# Patient Record
Sex: FEMALE | Race: WHITE | Employment: FULL TIME | URBAN - METROPOLITAN AREA
[De-identification: names, ages, dates, MRNs, and addresses within clinical notes are randomized per-mention and may not be internally consistent; named-entity substitution may affect disease eponyms.]

---

## 2023-08-22 PROBLEM — I10 ESSENTIAL (PRIMARY) HYPERTENSION: Status: ACTIVE | Noted: 2023-08-22

## 2023-08-22 PROBLEM — E78.5 HYPERLIPIDEMIA: Status: ACTIVE | Noted: 2023-08-22

## 2023-08-22 RX ORDER — LOSARTAN POTASSIUM AND HYDROCHLOROTHIAZIDE 25; 100 MG/1; MG/1
1 TABLET ORAL DAILY
COMMUNITY
Start: 2023-04-06 | End: 2024-02-06 | Stop reason: SDUPTHER

## 2023-10-12 ENCOUNTER — OFFICE VISIT (OUTPATIENT)
Dept: PRIMARY CARE | Facility: CLINIC | Age: 58
End: 2023-10-12
Payer: COMMERCIAL

## 2023-10-12 VITALS
SYSTOLIC BLOOD PRESSURE: 158 MMHG | WEIGHT: 182.4 LBS | HEART RATE: 90 BPM | HEIGHT: 66 IN | OXYGEN SATURATION: 98 % | BODY MASS INDEX: 29.32 KG/M2 | DIASTOLIC BLOOD PRESSURE: 90 MMHG

## 2023-10-12 DIAGNOSIS — R73.01 IMPAIRED FASTING GLUCOSE: ICD-10-CM

## 2023-10-12 DIAGNOSIS — E78.2 MIXED HYPERLIPIDEMIA: ICD-10-CM

## 2023-10-12 DIAGNOSIS — I10 ESSENTIAL (PRIMARY) HYPERTENSION: Primary | ICD-10-CM

## 2023-10-12 DIAGNOSIS — I10 PRIMARY HYPERTENSION: ICD-10-CM

## 2023-10-12 DIAGNOSIS — E78.5 HYPERLIPIDEMIA, UNSPECIFIED HYPERLIPIDEMIA TYPE: ICD-10-CM

## 2023-10-12 LAB — POC HEMOGLOBIN A1C: 5.9 % (ref 4.2–6.5)

## 2023-10-12 PROCEDURE — 3080F DIAST BP >= 90 MM HG: CPT | Performed by: PHYSICIAN ASSISTANT

## 2023-10-12 PROCEDURE — 99213 OFFICE O/P EST LOW 20 MIN: CPT | Performed by: PHYSICIAN ASSISTANT

## 2023-10-12 PROCEDURE — 3077F SYST BP >= 140 MM HG: CPT | Performed by: PHYSICIAN ASSISTANT

## 2023-10-12 RX ORDER — AMLODIPINE BESYLATE 5 MG/1
5 TABLET ORAL DAILY
Qty: 90 TABLET | Refills: 1 | Status: SHIPPED | OUTPATIENT
Start: 2023-10-12 | End: 2024-02-06 | Stop reason: SDUPTHER

## 2023-10-12 ASSESSMENT — ENCOUNTER SYMPTOMS
DEPRESSION: 0
OCCASIONAL FEELINGS OF UNSTEADINESS: 0
PALPITATIONS: 0
LOSS OF SENSATION IN FEET: 0
SHORTNESS OF BREATH: 0

## 2023-10-12 ASSESSMENT — PAIN SCALES - GENERAL: PAINLEVEL: 0-NO PAIN

## 2023-10-12 ASSESSMENT — PATIENT HEALTH QUESTIONNAIRE - PHQ9
SUM OF ALL RESPONSES TO PHQ9 QUESTIONS 1 AND 2: 0
1. LITTLE INTEREST OR PLEASURE IN DOING THINGS: NOT AT ALL
2. FEELING DOWN, DEPRESSED OR HOPELESS: NOT AT ALL

## 2023-10-12 NOTE — PATIENT INSTRUCTIONS
Recond amlodipine 5mg daily for blood pressure to achieve goal.   Ordered CT calcium scoring study to assess cardiac risk further.   Continue low carb diet and exercise recommendation.

## 2023-10-12 NOTE — PROGRESS NOTES
"Subjective   Patient ID: Dora Danielle is a 58 y.o. female who presents for Follow-up.    Presents for f/up on HTN. Doing well without side effects on medication. Due for A1C - hx of IFG last A1C 6.3%. She is watching carbs. No other concerns.          Review of Systems   Respiratory:  Negative for shortness of breath.    Cardiovascular:  Negative for chest pain, palpitations and leg swelling.       Objective   /90   Pulse 90   Ht 1.676 m (5' 6\")   Wt 82.7 kg (182 lb 6.4 oz)   SpO2 98%   BMI 29.44 kg/m²     Physical Exam  HENT:      Mouth/Throat:      Pharynx: Oropharynx is clear.   Cardiovascular:      Rate and Rhythm: Normal rate and regular rhythm.   Pulmonary:      Breath sounds: Normal breath sounds.   Neurological:      Mental Status: She is alert.         Assessment/Plan   Diagnoses and all orders for this visit:  Essential (primary) hypertension  -     amLODIPine (Norvasc) 5 mg tablet; Take 1 tablet (5 mg) by mouth once daily.  Impaired fasting glucose  -     POCT glycosylated hemoglobin (Hb A1C) manually resulted  Hyperlipidemia, unspecified hyperlipidemia type  Mixed hyperlipidemia  -     CT cardiac scoring wo IV contrast; Future  Primary hypertension  -     CT cardiac scoring wo IV contrast; Future         "

## 2023-12-15 ENCOUNTER — TELEMEDICINE (OUTPATIENT)
Dept: PRIMARY CARE | Facility: CLINIC | Age: 58
End: 2023-12-15
Payer: COMMERCIAL

## 2023-12-15 VITALS — BODY MASS INDEX: 29.44 KG/M2 | HEIGHT: 66 IN

## 2023-12-15 DIAGNOSIS — J06.9 UPPER RESPIRATORY TRACT INFECTION, UNSPECIFIED TYPE: Primary | ICD-10-CM

## 2023-12-15 PROCEDURE — 99441 PR PHYS/QHP TELEPHONE EVALUATION 5-10 MIN: CPT | Performed by: PHYSICIAN ASSISTANT

## 2023-12-15 ASSESSMENT — PAIN SCALES - GENERAL: PAINLEVEL: 0-NO PAIN

## 2023-12-15 ASSESSMENT — PATIENT HEALTH QUESTIONNAIRE - PHQ9
1. LITTLE INTEREST OR PLEASURE IN DOING THINGS: NOT AT ALL
2. FEELING DOWN, DEPRESSED OR HOPELESS: NOT AT ALL
SUM OF ALL RESPONSES TO PHQ9 QUESTIONS 1 AND 2: 0

## 2023-12-15 NOTE — PROGRESS NOTES
"Subjective   Patient ID: Dora Danielle is a 58 y.o. female who presents for Nasal Congestion.    Presents for c/o congestion/URI. States she woke up this morning with sore throat. Has had nasal congestion and pressure, throat irritation. Denies fever or chills, headache, body aches. Has had poor sleep and many sick contacts.          Review of Systems   All other systems reviewed and are negative.      Objective   Ht 1.676 m (5' 6\")   BMI 29.44 kg/m²     Physical Exam    Assessment/Plan   Diagnoses and all orders for this visit:  Upper respiratory tract infection, unspecified type         "

## 2023-12-15 NOTE — PATIENT INSTRUCTIONS
Recommend OTC cold medication, mucinex as needed, saline nasal spray. May take 5-7 days to go away. Covid testing advised, call with positive test for antivirals - no contraindication or drug interactions for treatment.

## 2023-12-18 ENCOUNTER — OFFICE VISIT (OUTPATIENT)
Dept: PRIMARY CARE | Facility: CLINIC | Age: 58
End: 2023-12-18
Payer: COMMERCIAL

## 2023-12-18 VITALS
OXYGEN SATURATION: 98 % | HEART RATE: 76 BPM | DIASTOLIC BLOOD PRESSURE: 80 MMHG | WEIGHT: 183 LBS | BODY MASS INDEX: 29.41 KG/M2 | HEIGHT: 66 IN | SYSTOLIC BLOOD PRESSURE: 130 MMHG

## 2023-12-18 DIAGNOSIS — J02.9 SORE THROAT: ICD-10-CM

## 2023-12-18 DIAGNOSIS — J06.9 UPPER RESPIRATORY TRACT INFECTION, UNSPECIFIED TYPE: Primary | ICD-10-CM

## 2023-12-18 LAB — POC RAPID STREP: NEGATIVE

## 2023-12-18 PROCEDURE — 87081 CULTURE SCREEN ONLY: CPT | Performed by: PHYSICIAN ASSISTANT

## 2023-12-18 PROCEDURE — 3075F SYST BP GE 130 - 139MM HG: CPT | Performed by: PHYSICIAN ASSISTANT

## 2023-12-18 PROCEDURE — 99213 OFFICE O/P EST LOW 20 MIN: CPT | Performed by: PHYSICIAN ASSISTANT

## 2023-12-18 PROCEDURE — 87880 STREP A ASSAY W/OPTIC: CPT | Performed by: PHYSICIAN ASSISTANT

## 2023-12-18 PROCEDURE — 3079F DIAST BP 80-89 MM HG: CPT | Performed by: PHYSICIAN ASSISTANT

## 2023-12-18 ASSESSMENT — ENCOUNTER SYMPTOMS: SORE THROAT: 1

## 2023-12-18 ASSESSMENT — PAIN SCALES - GENERAL: PAINLEVEL: 0-NO PAIN

## 2023-12-18 NOTE — LETTER
December 18, 2023     Patient: Dora Danielle   YOB: 1965   Date of Visit: 12/18/2023       To Whom It May Concern:    Dora Danielle was seen in my clinic on 12/18/2023 at 2:45 pm. Please excuse Dora for her absence from work from 12/15/23-12/19/23. May return 12/20/23.    If you have any questions or concerns, please don't hesitate to call.         Sincerely,         Bennett Rooney PA-C        CC: No Recipients

## 2023-12-18 NOTE — PROGRESS NOTES
"Subjective   Patient ID: Dora Danielle is a 58 y.o. female who presents for Sore Throat (Patient states Sx started Friday. Patient states she has been congested. Patient states she has amoxicillin, taken three. COVID-neg. Patient states she was very cold but denies fever. ).    Presents for follow up URI - continues with sore throat, congestion since resolved with cold medication. Feels a bit better. Throat very sore and has had swollen glands. Covid testing negative yesterday.    Sore Throat          Review of Systems   HENT:  Positive for sore throat.    All other systems reviewed and are negative.      Objective   /80   Pulse 76   Ht 1.676 m (5' 6\")   Wt 83 kg (183 lb)   SpO2 98%   BMI 29.54 kg/m²     Physical Exam  HENT:      Head: Normocephalic and atraumatic.      Nose: Nose normal.      Mouth/Throat:      Pharynx: Posterior oropharyngeal erythema present.   Eyes:      Pupils: Pupils are equal, round, and reactive to light.   Pulmonary:      Breath sounds: Normal breath sounds.   Neurological:      Mental Status: She is alert.         Assessment/Plan   Diagnoses and all orders for this visit:  Upper respiratory tract infection, unspecified type  Sore throat  -     POCT Rapid Strep A manually resulted  -     Group A Streptococcus, Culture         "

## 2023-12-21 LAB — S PYO THROAT QL CULT: NORMAL

## 2024-02-06 DIAGNOSIS — I10 ESSENTIAL (PRIMARY) HYPERTENSION: ICD-10-CM

## 2024-02-06 RX ORDER — LOSARTAN POTASSIUM AND HYDROCHLOROTHIAZIDE 25; 100 MG/1; MG/1
1 TABLET ORAL DAILY
Qty: 90 TABLET | Refills: 1 | Status: SHIPPED | OUTPATIENT
Start: 2024-02-06

## 2024-02-06 RX ORDER — AMLODIPINE BESYLATE 5 MG/1
5 TABLET ORAL DAILY
Qty: 90 TABLET | Refills: 1 | Status: SHIPPED | OUTPATIENT
Start: 2024-02-06 | End: 2025-02-05

## 2024-02-06 NOTE — TELEPHONE ENCOUNTER
Patient called stating she missed placed both meds on Friday 2/2/204.  she was running low as well and would like for you to refill.

## 2024-02-13 ENCOUNTER — TELEPHONE (OUTPATIENT)
Dept: PRIMARY CARE | Facility: CLINIC | Age: 59
End: 2024-02-13
Payer: COMMERCIAL

## 2024-02-13 DIAGNOSIS — R73.01 IMPAIRED FASTING GLUCOSE: ICD-10-CM

## 2024-02-13 DIAGNOSIS — E78.5 HYPERLIPIDEMIA, UNSPECIFIED HYPERLIPIDEMIA TYPE: ICD-10-CM

## 2024-02-13 DIAGNOSIS — I10 ESSENTIAL (PRIMARY) HYPERTENSION: Primary | ICD-10-CM

## 2024-02-28 ENCOUNTER — LAB (OUTPATIENT)
Dept: LAB | Facility: LAB | Age: 59
End: 2024-02-28
Payer: COMMERCIAL

## 2024-02-28 DIAGNOSIS — E78.5 HYPERLIPIDEMIA, UNSPECIFIED HYPERLIPIDEMIA TYPE: ICD-10-CM

## 2024-02-28 DIAGNOSIS — R73.01 IMPAIRED FASTING GLUCOSE: ICD-10-CM

## 2024-02-28 LAB
ALBUMIN SERPL BCP-MCNC: 4.5 G/DL (ref 3.4–5)
ALP SERPL-CCNC: 67 U/L (ref 33–136)
ALT SERPL W P-5'-P-CCNC: 26 U/L (ref 7–45)
ANION GAP SERPL CALC-SCNC: 15 MMOL/L (ref 10–20)
AST SERPL W P-5'-P-CCNC: 27 U/L (ref 9–39)
BILIRUB SERPL-MCNC: 0.5 MG/DL (ref 0–1.2)
BUN SERPL-MCNC: 16 MG/DL (ref 6–23)
CALCIUM SERPL-MCNC: 10.9 MG/DL (ref 8.6–10.6)
CHLORIDE SERPL-SCNC: 103 MMOL/L (ref 98–107)
CHOLEST SERPL-MCNC: 248 MG/DL (ref 0–199)
CHOLESTEROL/HDL RATIO: 2.2
CO2 SERPL-SCNC: 27 MMOL/L (ref 21–32)
CREAT SERPL-MCNC: 0.8 MG/DL (ref 0.5–1.05)
EGFRCR SERPLBLD CKD-EPI 2021: 84 ML/MIN/1.73M*2
EST. AVERAGE GLUCOSE BLD GHB EST-MCNC: 120 MG/DL
GLUCOSE SERPL-MCNC: 111 MG/DL (ref 74–99)
HBA1C MFR BLD: 5.8 %
HDLC SERPL-MCNC: 112.4 MG/DL
LDLC SERPL CALC-MCNC: 114 MG/DL
NON HDL CHOLESTEROL: 136 MG/DL (ref 0–149)
POTASSIUM SERPL-SCNC: 3.8 MMOL/L (ref 3.5–5.3)
PROT SERPL-MCNC: 7.1 G/DL (ref 6.4–8.2)
SODIUM SERPL-SCNC: 141 MMOL/L (ref 136–145)
TRIGL SERPL-MCNC: 110 MG/DL (ref 0–149)
VLDL: 22 MG/DL (ref 0–40)

## 2024-02-28 PROCEDURE — 83036 HEMOGLOBIN GLYCOSYLATED A1C: CPT

## 2024-02-28 PROCEDURE — 80053 COMPREHEN METABOLIC PANEL: CPT

## 2024-02-28 PROCEDURE — 80061 LIPID PANEL: CPT

## 2024-02-28 PROCEDURE — 36415 COLL VENOUS BLD VENIPUNCTURE: CPT

## 2024-02-29 ENCOUNTER — APPOINTMENT (OUTPATIENT)
Dept: PRIMARY CARE | Facility: CLINIC | Age: 59
End: 2024-02-29
Payer: COMMERCIAL

## 2024-04-30 ENCOUNTER — OFFICE VISIT (OUTPATIENT)
Dept: PRIMARY CARE | Facility: CLINIC | Age: 59
End: 2024-04-30
Payer: COMMERCIAL

## 2024-04-30 VITALS
DIASTOLIC BLOOD PRESSURE: 80 MMHG | BODY MASS INDEX: 29.06 KG/M2 | SYSTOLIC BLOOD PRESSURE: 132 MMHG | HEART RATE: 84 BPM | WEIGHT: 180.8 LBS | HEIGHT: 66 IN | OXYGEN SATURATION: 96 %

## 2024-04-30 DIAGNOSIS — Z13.820 ENCOUNTER FOR SCREENING FOR OSTEOPOROSIS: ICD-10-CM

## 2024-04-30 DIAGNOSIS — E78.5 HYPERLIPIDEMIA, UNSPECIFIED HYPERLIPIDEMIA TYPE: ICD-10-CM

## 2024-04-30 DIAGNOSIS — R73.01 IMPAIRED FASTING GLUCOSE: ICD-10-CM

## 2024-04-30 DIAGNOSIS — Z87.891 HISTORY OF TOBACCO USE: ICD-10-CM

## 2024-04-30 DIAGNOSIS — N95.1 MENOPAUSAL STATE: ICD-10-CM

## 2024-04-30 DIAGNOSIS — Z12.31 BREAST CANCER SCREENING BY MAMMOGRAM: ICD-10-CM

## 2024-04-30 DIAGNOSIS — Z00.00 ROUTINE GENERAL MEDICAL EXAMINATION AT A HEALTH CARE FACILITY: Primary | ICD-10-CM

## 2024-04-30 DIAGNOSIS — I10 ESSENTIAL (PRIMARY) HYPERTENSION: ICD-10-CM

## 2024-04-30 DIAGNOSIS — E83.52 HYPERCALCEMIA: ICD-10-CM

## 2024-04-30 PROCEDURE — 99396 PREV VISIT EST AGE 40-64: CPT | Performed by: PHYSICIAN ASSISTANT

## 2024-04-30 PROCEDURE — 3075F SYST BP GE 130 - 139MM HG: CPT | Performed by: PHYSICIAN ASSISTANT

## 2024-04-30 PROCEDURE — 3079F DIAST BP 80-89 MM HG: CPT | Performed by: PHYSICIAN ASSISTANT

## 2024-04-30 ASSESSMENT — PATIENT HEALTH QUESTIONNAIRE - PHQ9
2. FEELING DOWN, DEPRESSED OR HOPELESS: NOT AT ALL
1. LITTLE INTEREST OR PLEASURE IN DOING THINGS: NOT AT ALL
SUM OF ALL RESPONSES TO PHQ9 QUESTIONS 1 AND 2: 0

## 2024-04-30 ASSESSMENT — PAIN SCALES - GENERAL: PAINLEVEL: 0-NO PAIN

## 2024-04-30 NOTE — PROGRESS NOTES
Subjective   Patient ID: Dora Danielle is a 59 y.o. female who presents for Annual Exam.    Presents for RPE.   Below is the patient's most recent value for Albumin, ALT, AST, BUN, Calcium, Chloride, Cholesterol, CO2, Creatinine, GFR, Glucose, HDL, Hematocrit, Hemoglobin, Hemoglobin A1C, LDL, Magnesium, Phosphorus, Platelets, Potassium, PSA, Sodium, Triglycerides, and WBC.   Lab Results       Component                Value               Date                       ALBUMIN                  4.5                 02/28/2024                 ALT                      26                  02/28/2024                 AST                      27                  02/28/2024                 BUN                      16                  02/28/2024                 CALCIUM                  10.9 (H)            02/28/2024                 CL                       103                 02/28/2024                 CHOL                     248 (H)             02/28/2024                 CO2                      27                  02/28/2024                 CREATININE               0.80                02/28/2024                 HDL                      112.4               02/28/2024                 HCT                      46.5 (H)            12/21/2022                 HGB                      16.2 (H)            12/21/2022                 HGBA1C                   5.8 (H)             02/28/2024                 PLT                      206                 12/21/2022                 K                        3.8                 02/28/2024                 NA                       141                 02/28/2024                 TRIG                     110                 02/28/2024                 WBC                      5.0                 12/21/2022            Note: for a comprehensive list of the patient's lab results, access the Results Review activity.           Review of Systems   All other systems reviewed and are negative.      Objective  "  /80   Pulse 84   Ht 1.676 m (5' 6\")   Wt 82 kg (180 lb 12.8 oz)   SpO2 96%   BMI 29.18 kg/m²     Physical Exam  Constitutional:       General: She is not in acute distress.     Appearance: Normal appearance.   HENT:      Right Ear: Tympanic membrane and ear canal normal.      Left Ear: Tympanic membrane and ear canal normal.      Nose: No congestion.      Mouth/Throat:      Pharynx: Oropharynx is clear.   Eyes:      Conjunctiva/sclera: Conjunctivae normal.      Pupils: Pupils are equal, round, and reactive to light.   Cardiovascular:      Rate and Rhythm: Normal rate and regular rhythm.      Heart sounds: No murmur heard.  Pulmonary:      Breath sounds: Normal breath sounds.   Abdominal:      General: Bowel sounds are normal.      Palpations: Abdomen is soft.   Musculoskeletal:         General: Normal range of motion.   Lymphadenopathy:      Cervical: No cervical adenopathy.   Skin:     Findings: No rash.   Neurological:      General: No focal deficit present.      Mental Status: She is alert.         Assessment/Plan   Diagnoses and all orders for this visit:  Routine general medical examination at a health care facility  Essential (primary) hypertension  Hyperlipidemia, unspecified hyperlipidemia type  Impaired fasting glucose  Menopausal state  -     XR DEXA bone density axial skeleton w VFA; Future  Encounter for screening for osteoporosis  -     XR DEXA bone density axial skeleton w VFA; Future         "

## 2024-05-22 ENCOUNTER — HOSPITAL ENCOUNTER (OUTPATIENT)
Dept: RADIOLOGY | Facility: HOSPITAL | Age: 59
Discharge: HOME | End: 2024-05-22
Payer: COMMERCIAL

## 2024-05-22 DIAGNOSIS — Z87.891 HISTORY OF TOBACCO USE: ICD-10-CM

## 2024-05-22 DIAGNOSIS — Z13.820 ENCOUNTER FOR SCREENING FOR OSTEOPOROSIS: ICD-10-CM

## 2024-05-22 DIAGNOSIS — N95.1 MENOPAUSAL STATE: ICD-10-CM

## 2024-05-22 PROCEDURE — 71271 CT THORAX LUNG CANCER SCR C-: CPT

## 2024-05-22 PROCEDURE — 77085 DXA BONE DENSITY AXL VRT FX: CPT | Performed by: RADIOLOGY

## 2024-05-22 PROCEDURE — 77085 DXA BONE DENSITY AXL VRT FX: CPT

## 2024-05-23 DIAGNOSIS — R91.1 LUNG NODULE: Primary | ICD-10-CM

## 2024-06-17 DIAGNOSIS — I10 ESSENTIAL (PRIMARY) HYPERTENSION: ICD-10-CM

## 2024-06-17 RX ORDER — LOSARTAN POTASSIUM AND HYDROCHLOROTHIAZIDE 25; 100 MG/1; MG/1
1 TABLET ORAL DAILY
Qty: 90 TABLET | Refills: 1 | Status: SHIPPED | OUTPATIENT
Start: 2024-06-17

## 2024-06-17 RX ORDER — AMLODIPINE BESYLATE 5 MG/1
5 TABLET ORAL DAILY
Qty: 90 TABLET | Refills: 1 | Status: SHIPPED | OUTPATIENT
Start: 2024-06-17 | End: 2025-06-17

## 2024-06-21 ENCOUNTER — TELEPHONE (OUTPATIENT)
Dept: PRIMARY CARE | Facility: CLINIC | Age: 59
End: 2024-06-21
Payer: COMMERCIAL

## 2024-06-21 NOTE — TELEPHONE ENCOUNTER
Patient called requesting an order/letter stating she needs to be put to sleep in order to get her chest CT done due to her being claustrophobic. Patient said the one she had done last month was miserable, it was extremely hard for her to remain calm and still.

## 2024-07-11 NOTE — TELEPHONE ENCOUNTER
Discussed options with patient - has taken diazepam in the past which helped. Will prescribe this for prior to imaging. She is aware she will not be able to drive while taking this. Can call scheduling - CT to be done 11/22 or later.

## 2024-07-11 NOTE — TELEPHONE ENCOUNTER
Patient called again requesting an order to be put to sleep or sedated for her follow up CT in 6 months due to her claustrophobia.

## 2024-09-10 ENCOUNTER — TELEPHONE (OUTPATIENT)
Dept: PRIMARY CARE | Facility: CLINIC | Age: 59
End: 2024-09-10
Payer: COMMERCIAL

## 2024-09-10 NOTE — TELEPHONE ENCOUNTER
Pt called scheduling an appt for Sept. 24th with Bennett to discuss left arm pain. Pt states that she has been having left arm pain and numbness in the fingers in that hand as well. Pt wanted to make an appt later due to pt being out of town. Pt would like to know what she should do in the mean time. Pt states that she believes that her symptoms are due to having the covid shot in that arm about three years ago. Pt can be reached at [phone].

## 2024-09-24 ENCOUNTER — APPOINTMENT (OUTPATIENT)
Dept: PRIMARY CARE | Facility: CLINIC | Age: 59
End: 2024-09-24
Payer: COMMERCIAL

## 2024-10-22 DIAGNOSIS — I10 ESSENTIAL (PRIMARY) HYPERTENSION: ICD-10-CM

## 2024-10-22 RX ORDER — AMLODIPINE BESYLATE 5 MG/1
5 TABLET ORAL DAILY
Qty: 90 TABLET | Refills: 1 | Status: SHIPPED | OUTPATIENT
Start: 2024-10-22 | End: 2025-10-22

## 2024-10-22 RX ORDER — LOSARTAN POTASSIUM AND HYDROCHLOROTHIAZIDE 25; 100 MG/1; MG/1
1 TABLET ORAL DAILY
Qty: 90 TABLET | Refills: 1 | Status: SHIPPED | OUTPATIENT
Start: 2024-10-22

## 2024-11-27 ENCOUNTER — LAB (OUTPATIENT)
Dept: LAB | Facility: LAB | Age: 59
End: 2024-11-27
Payer: COMMERCIAL

## 2024-11-27 DIAGNOSIS — E78.5 HYPERLIPIDEMIA, UNSPECIFIED HYPERLIPIDEMIA TYPE: ICD-10-CM

## 2024-11-27 DIAGNOSIS — E83.52 HYPERCALCEMIA: ICD-10-CM

## 2024-11-27 LAB
25(OH)D3 SERPL-MCNC: 10 NG/ML (ref 30–100)
ALBUMIN SERPL BCP-MCNC: 4.3 G/DL (ref 3.4–5)
ALP SERPL-CCNC: 83 U/L (ref 33–136)
ALT SERPL W P-5'-P-CCNC: 21 U/L (ref 7–45)
ANION GAP SERPL CALC-SCNC: 15 MMOL/L (ref 10–20)
AST SERPL W P-5'-P-CCNC: 22 U/L (ref 9–39)
BILIRUB SERPL-MCNC: 0.4 MG/DL (ref 0–1.2)
BUN SERPL-MCNC: 17 MG/DL (ref 6–23)
CA-I BLD-SCNC: 1.38 MMOL/L (ref 1.1–1.33)
CALCIUM SERPL-MCNC: 10.6 MG/DL (ref 8.6–10.6)
CHLORIDE SERPL-SCNC: 103 MMOL/L (ref 98–107)
CHOLEST SERPL-MCNC: 246 MG/DL (ref 0–199)
CHOLESTEROL/HDL RATIO: 2.5
CO2 SERPL-SCNC: 28 MMOL/L (ref 21–32)
CREAT SERPL-MCNC: 0.82 MG/DL (ref 0.5–1.05)
EGFRCR SERPLBLD CKD-EPI 2021: 81 ML/MIN/1.73M*2
GLUCOSE SERPL-MCNC: 114 MG/DL (ref 74–99)
HDLC SERPL-MCNC: 98.3 MG/DL
LDLC SERPL CALC-MCNC: 106 MG/DL
NON HDL CHOLESTEROL: 148 MG/DL (ref 0–149)
POTASSIUM SERPL-SCNC: 3.9 MMOL/L (ref 3.5–5.3)
PROT SERPL-MCNC: 6.8 G/DL (ref 6.4–8.2)
PTH-INTACT SERPL-MCNC: 22 PG/ML (ref 18.5–88)
SODIUM SERPL-SCNC: 142 MMOL/L (ref 136–145)
TRIGL SERPL-MCNC: 211 MG/DL (ref 0–149)
VLDL: 42 MG/DL (ref 0–40)

## 2024-11-27 PROCEDURE — 80061 LIPID PANEL: CPT

## 2024-11-27 PROCEDURE — 82306 VITAMIN D 25 HYDROXY: CPT

## 2024-11-27 PROCEDURE — 80053 COMPREHEN METABOLIC PANEL: CPT

## 2024-11-27 PROCEDURE — 83970 ASSAY OF PARATHORMONE: CPT

## 2024-11-27 PROCEDURE — 36415 COLL VENOUS BLD VENIPUNCTURE: CPT

## 2024-11-27 PROCEDURE — 82330 ASSAY OF CALCIUM: CPT

## 2024-12-04 ENCOUNTER — APPOINTMENT (OUTPATIENT)
Dept: PRIMARY CARE | Facility: CLINIC | Age: 59
End: 2024-12-04
Payer: COMMERCIAL

## 2024-12-04 ENCOUNTER — HOSPITAL ENCOUNTER (OUTPATIENT)
Dept: RADIOLOGY | Facility: HOSPITAL | Age: 59
Discharge: HOME | End: 2024-12-04
Payer: COMMERCIAL

## 2024-12-04 DIAGNOSIS — R91.1 LUNG NODULE: ICD-10-CM

## 2024-12-04 PROCEDURE — 71250 CT THORAX DX C-: CPT

## 2024-12-04 PROCEDURE — 71250 CT THORAX DX C-: CPT | Performed by: RADIOLOGY

## 2025-01-22 ENCOUNTER — OFFICE VISIT (OUTPATIENT)
Dept: PRIMARY CARE | Facility: CLINIC | Age: 60
End: 2025-01-22
Payer: COMMERCIAL

## 2025-01-22 VITALS
DIASTOLIC BLOOD PRESSURE: 78 MMHG | OXYGEN SATURATION: 96 % | SYSTOLIC BLOOD PRESSURE: 138 MMHG | WEIGHT: 179 LBS | BODY MASS INDEX: 28.77 KG/M2 | HEIGHT: 66 IN | HEART RATE: 90 BPM

## 2025-01-22 DIAGNOSIS — I10 ESSENTIAL (PRIMARY) HYPERTENSION: ICD-10-CM

## 2025-01-22 DIAGNOSIS — M54.12 CERVICAL RADICULOPATHY: ICD-10-CM

## 2025-01-22 DIAGNOSIS — E78.5 HYPERLIPIDEMIA, UNSPECIFIED HYPERLIPIDEMIA TYPE: ICD-10-CM

## 2025-01-22 DIAGNOSIS — R73.01 IMPAIRED FASTING GLUCOSE: ICD-10-CM

## 2025-01-22 DIAGNOSIS — E55.9 VITAMIN D DEFICIENCY: Primary | ICD-10-CM

## 2025-01-22 PROCEDURE — 3075F SYST BP GE 130 - 139MM HG: CPT | Performed by: PHYSICIAN ASSISTANT

## 2025-01-22 PROCEDURE — 3008F BODY MASS INDEX DOCD: CPT | Performed by: PHYSICIAN ASSISTANT

## 2025-01-22 PROCEDURE — 99214 OFFICE O/P EST MOD 30 MIN: CPT | Performed by: PHYSICIAN ASSISTANT

## 2025-01-22 PROCEDURE — 3078F DIAST BP <80 MM HG: CPT | Performed by: PHYSICIAN ASSISTANT

## 2025-01-22 RX ORDER — GABAPENTIN 300 MG/1
300 CAPSULE ORAL NIGHTLY
Qty: 30 CAPSULE | Refills: 2 | Status: SHIPPED | OUTPATIENT
Start: 2025-01-22 | End: 2025-07-21

## 2025-01-22 RX ORDER — ERGOCALCIFEROL 1.25 MG/1
50000 CAPSULE ORAL WEEKLY
Qty: 12 CAPSULE | Refills: 1 | Status: SHIPPED | OUTPATIENT
Start: 2025-01-22 | End: 2025-07-09

## 2025-01-22 ASSESSMENT — PATIENT HEALTH QUESTIONNAIRE - PHQ9
SUM OF ALL RESPONSES TO PHQ9 QUESTIONS 1 AND 2: 0
2. FEELING DOWN, DEPRESSED OR HOPELESS: NOT AT ALL
1. LITTLE INTEREST OR PLEASURE IN DOING THINGS: NOT AT ALL

## 2025-01-22 ASSESSMENT — PAIN SCALES - GENERAL: PAINLEVEL_OUTOF10: 0-NO PAIN

## 2025-01-22 ASSESSMENT — ENCOUNTER SYMPTOMS
NUMBNESS: 1
NECK PAIN: 0

## 2025-01-22 NOTE — PROGRESS NOTES
"Subjective   Patient ID: Dora Danielle is a 59 y.o. female who presents for Follow-up.    Patient is here for a regular check-up. She states she was supposed to be here in November but was busy.    Describes left arm numbness ever since the first Covid vaccine. States it is sometimes hard to lay on and believes it's getting worse. She states the tingling is in her fingers along with decreased sensation. States it is starting to become painful later in the day for the past 6 months. Denies pain with movement, shoulder pain, or any prior injury.         Review of Systems   Musculoskeletal:  Negative for neck pain.   Neurological:  Positive for numbness (Entirety of left arm with tingling in the fingers).   All other systems reviewed and are negative.      Objective   /78   Pulse 90   Ht 1.676 m (5' 6\")   Wt 81.2 kg (179 lb)   SpO2 96%   BMI 28.89 kg/m²     Physical Exam  Constitutional:       Appearance: Normal appearance.   HENT:      Right Ear: Tympanic membrane normal.      Left Ear: Tympanic membrane normal.      Mouth/Throat:      Mouth: Mucous membranes are moist.   Eyes:      Extraocular Movements: Extraocular movements intact.      Pupils: Pupils are equal, round, and reactive to light.   Cardiovascular:      Rate and Rhythm: Normal rate and regular rhythm.   Pulmonary:      Effort: Pulmonary effort is normal.      Breath sounds: Normal breath sounds.   Abdominal:      General: Bowel sounds are normal.      Palpations: Abdomen is soft.   Musculoskeletal:      Left shoulder: Decreased range of motion (Pain in left shoulder with shoulder flexion). Decreased strength (Deficit with empty can test).      Cervical back: Tenderness (Tenderness to palpation of the left rhomboid muscles) present. Decreased range of motion (Deficit with right lateral flexion).      Comments: Negative Phalen's sign   Neurological:      Mental Status: She is alert and oriented to person, place, and time. "         Assessment/Plan   Diagnoses and all orders for this visit:  Vitamin D deficiency  -     ergocalciferol (Vitamin D-2) 1.25 MG (42167 UT) capsule; Take 1 capsule (50,000 Units) by mouth 1 (one) time per week.  -     Vitamin D 25-Hydroxy,Total (for eval of Vitamin D levels); Future  Cervical radiculopathy  -     XR cervical spine complete 4-5 views; Future  -     Referral to Physical Therapy; Future  -     gabapentin (Neurontin) 300 mg capsule; Take 1 capsule (300 mg) by mouth once daily at bedtime.  Essential (primary) hypertension  Impaired fasting glucose  -     Hemoglobin A1C; Future  -     Lipid Panel; Future  -     Comprehensive Metabolic Panel; Future  Hyperlipidemia, unspecified hyperlipidemia type  -     Hemoglobin A1C; Future  -     Lipid Panel; Future  -     Comprehensive Metabolic Panel; Future

## 2025-01-28 ENCOUNTER — HOSPITAL ENCOUNTER (OUTPATIENT)
Dept: RADIOLOGY | Facility: CLINIC | Age: 60
Discharge: HOME | End: 2025-01-28
Payer: COMMERCIAL

## 2025-01-28 DIAGNOSIS — M54.12 CERVICAL RADICULOPATHY: ICD-10-CM

## 2025-01-28 PROCEDURE — 72050 X-RAY EXAM NECK SPINE 4/5VWS: CPT

## 2025-01-31 DIAGNOSIS — I10 ESSENTIAL (PRIMARY) HYPERTENSION: ICD-10-CM

## 2025-01-31 DIAGNOSIS — F40.240 CLAUSTROPHOBIA: ICD-10-CM

## 2025-01-31 DIAGNOSIS — M54.12 CERVICAL RADICULOPATHY: Primary | ICD-10-CM

## 2025-01-31 RX ORDER — AMLODIPINE BESYLATE 5 MG/1
5 TABLET ORAL DAILY
Qty: 90 TABLET | Refills: 1 | Status: SHIPPED | OUTPATIENT
Start: 2025-01-31 | End: 2026-01-31

## 2025-01-31 RX ORDER — LOSARTAN POTASSIUM AND HYDROCHLOROTHIAZIDE 25; 100 MG/1; MG/1
1 TABLET ORAL DAILY
Qty: 90 TABLET | Refills: 1 | Status: SHIPPED | OUTPATIENT
Start: 2025-01-31

## 2025-01-31 NOTE — TELEPHONE ENCOUNTER
Patient verbally understands. Requesting some Valium for the MRI. Pharmacy is [facility] [place].

## 2025-01-31 NOTE — TELEPHONE ENCOUNTER
Recommend MRI of her neck. She has several areas of disc space narrowing. She can start PT in the meantime.

## 2025-02-05 RX ORDER — DIAZEPAM 5 MG/1
5-10 TABLET ORAL DAILY PRN
Qty: 2 TABLET | Refills: 0 | Status: SHIPPED | OUTPATIENT
Start: 2025-02-05 | End: 2025-02-06

## 2025-02-17 ENCOUNTER — EVALUATION (OUTPATIENT)
Dept: PHYSICAL THERAPY | Facility: CLINIC | Age: 60
End: 2025-02-17
Payer: COMMERCIAL

## 2025-02-17 DIAGNOSIS — M54.12 CERVICAL RADICULOPATHY: ICD-10-CM

## 2025-02-17 PROCEDURE — 97161 PT EVAL LOW COMPLEX 20 MIN: CPT | Mod: GP

## 2025-02-17 ASSESSMENT — PAIN SCALES - GENERAL: PAINLEVEL_OUTOF10: 8

## 2025-02-17 ASSESSMENT — PAIN - FUNCTIONAL ASSESSMENT: PAIN_FUNCTIONAL_ASSESSMENT: 0-10

## 2025-02-17 ASSESSMENT — PAIN DESCRIPTION - DESCRIPTORS: DESCRIPTORS: ACHING

## 2025-02-17 NOTE — PROGRESS NOTES
Physical Therapy Evaluation    Patient Name: Dora Danielle  MRN: [id number]  Evaluation Date: 2/17/2025  Time Calculation  Start Time: 0830  Stop Time: 0900  Time Calculation (min): 30 min  PT Evaluation Time Entry  PT Evaluation (Low) Time Entry: 30             Problem List Items Addressed This Visit             ICD-10-CM    Cervical radiculopathy M54.12    Relevant Orders    Follow Up In Physical Therapy         Subjective   General:  General  Reason for Referral: Cervical Radiculopathy  Referred By: Bennett Rooney PA-C  Past Medical History Relevant to Rehab: 60 y/o F who presents for PT evaluation with cc of two year hx of B/L neck/shoulder pain L > R with h/o L hand paraesthesia. MRI pending. XR revealing arthritis.  Pain worse at night. Not bad during day.  Denies falls, trauma, or h/o neck injury.    Patient reported hx of condition: H/O radicular neck/shoulder pain x 2 years, non-specific onset    Surgery:   No    Precautions:   Neck pain considerations    Relevant PMH:  Smoker, HTN, R wrist FX    Red flags: No    Pain:  Pain Assessment: 0-10  0-10 (Numeric) Pain Score: 8  Pain Type: Acute pain  Pain Location: Neck  Pain Orientation: Other (Comment) (B/L upper trap and shoulders L > R)  Pain Radiating Towards: L hand  Pain Descriptors: Aching  Home Living:  Home Living Comment: Independent    Prior Function Per Pt/Caregiver Report:  Ambulatory Assistance: Independent    Imaging:  XR C SPINE;  AP, lateral and oblique views were obtained. Vertebral body heights  are maintained. Endplate hypertrophy is noted C4 through C7. The C4-5  and C5-6 discs are moderately narrowed. Diffuse facet arthropathy is  present. Uncovertebral hypertrophy contributes to foraminal narrowing  at C4-5 and C5-6 levels. There is no spondylolisthesis.      OBJECTIVE:  Objective   Posture:  Posture Comment: Forward head posturing, B/L scapular winging with poor ADDuction and depression  Range of Motion:  Range of Motion  Comments: R cervical rotation 52, L 60 degrees.  Flexion/extension WNL, non painful. Pain only with R rotation  Strength:  Strength Comments: 3+/5 B/L UE's  Flexibility:  Flexibility Comment: + Trigger points L Capitis, rhomboids, L posterior delt  Palpation:  Palpation Comment: Tender L neck/shoulder with palpation  Special Tests:  Special Tests Comment: Negative alar ligament. Negative shoulder articular exam B/L  Gait:  Gait Comment: WNl  Balance:  Balance Comment: WNL  Stairs:  Stairs Comment: N/A  Bed Mobility:  Bed Mobility Comment: N/A  Transfers:  Transfers Comment: IND    Outcome Measures:  NDI: 10% impairment    Assessment  PT Assessment Results: Decreased strength, Decreased range of motion, Decreased endurance, Pain, Impaired sensation, Decreased mobility  Rehab Prognosis: Good  Evaluation/Treatment Tolerance: Patient limited by pain    Pt is a 59 y.o. female who presents with impairments of neck/shoulder pain, impaired neck AROM, impaired UE strength. These impairments have led to functional limitations including impaired sleep quality and activity tolerance. Pt would benefit from skilled physical therapy intervention to improve above impairments and facilitate return to function.    Complexity of Evaluation: Low    Based on the history including personal factors and/or comorbidities, examination of body systems including body structures and function, activity limitations, and/or participation restrictions, as well as clinical presentation, patient meets criteria for above complexity evaluation.    Plan  Treatment/Interventions: Biofeedback, Aquatic therapy, Cryotherapy, Dry needling, Education/ Instruction, Electrical stimulation, Manual therapy, Mechanical traction, Neuromuscular re-education, Self care/ home management, Taping techniques, Therapeutic activities, Therapeutic exercises, Ultrasound  PT Plan: Skilled PT  PT Frequency: 2 times per week  Certification Period Start Date: 02/17/25  Number of  Treatments Authorized: MN, re-evaluate visit 10  Rehab Potential: Good  Plan of Care Agreement: Patient    Insurance Plan: Payor: JOHNNY / Plan: [employer] HEALTH PLAN / Product Type: *No Product type* /     Plan for next visit: Manual STM and cervical manual traction, neck/posterior chain strengthening.     OP EDUCATION:  Outpatient Education  Individual(s) Educated: Patient  Education Provided: POC  Risk and Benefits Discussed with Patient/Caregiver/Other: yes  Patient/Caregiver Demonstrated Understanding: yes  Plan of Care Discussed and Agreed Upon: yes  Patient Response to Education: Patient/Caregiver Verbalized Understanding of Information    Today's Treatment:  Evaluation and discussion only  HEP to be completed daily, exercises include:  To be developed    Goals:  STG 1: Patient will be IND with postural correction/scapular strengthening x 5 visits  LTG 1: Patient will report 0% score NDI  LTG 2: Patient will demonstrate 4/5 B/L UE strength  LTG 3: Patient will demonstrate 60 deg B/L cervical rotation  LTG 4: Patient will report > 50% improvement in sleep quality with regards to cervical radiculopathy symptoms.

## 2025-02-27 ENCOUNTER — HOSPITAL ENCOUNTER (OUTPATIENT)
Dept: RADIOLOGY | Facility: CLINIC | Age: 60
Discharge: HOME | End: 2025-02-27
Payer: COMMERCIAL

## 2025-02-27 DIAGNOSIS — M54.12 CERVICAL RADICULOPATHY: ICD-10-CM

## 2025-02-27 PROCEDURE — 72141 MRI NECK SPINE W/O DYE: CPT

## 2025-02-28 ENCOUNTER — TELEPHONE (OUTPATIENT)
Dept: PRIMARY CARE | Facility: CLINIC | Age: 60
End: 2025-02-28
Payer: COMMERCIAL

## 2025-02-28 DIAGNOSIS — M54.12 CERVICAL RADICULOPATHY: Primary | ICD-10-CM

## 2025-02-28 NOTE — TELEPHONE ENCOUNTER
MRI shows several regions of disc herniations and nerve entrapment. Recommend continued PT and follow up with pain medicine, will place referral with Dr. Webster

## 2025-03-05 ENCOUNTER — TREATMENT (OUTPATIENT)
Dept: PHYSICAL THERAPY | Facility: CLINIC | Age: 60
End: 2025-03-05
Payer: COMMERCIAL

## 2025-03-05 DIAGNOSIS — M54.12 CERVICAL RADICULOPATHY: ICD-10-CM

## 2025-03-05 PROCEDURE — 97012 MECHANICAL TRACTION THERAPY: CPT | Mod: GP

## 2025-03-05 NOTE — PROGRESS NOTES
"    Physical Therapy Treatment    Patient Name: Dora Danielle  MRN: [id number]  Encounter date:  3/5/2025  Time Calculation  Start Time: 0745  Stop Time: 0815  Time Calculation (min): 30 min  PT Modalities Time Entry  Mechanical Traction Time Entry: 15          Visit Number:  2 (including evaluation)  Planned total visits: 10-20  Visits Authorized/Insurance Coverage:  [phone]    Current Problem  Problem List Items Addressed This Visit             ICD-10-CM    Cervical radiculopathy M54.12       Precautions   Neck/UE pain considerations    Pain  6-7    Subjective  No changes since evaluation.  Had MRI.  Shows multi-level degen and disc bulge on spinal cord C4-C5    Objective  Limited neck AROM    Treatment:  Mechanical traction x 15 minutes for cervical spine vertebral and cord decompression  60/20 on/off x 10/5 lb pulls  Wedge under LE's  No CI identified.  Gives consent.     Current HEP:  Chin tuck issued today, 3 x 10 reps BID    Activity tolerance:  Tolerates traction    OP EDUCATION:  Reviewed MRI indications and benefits of traction, importance of neck/postural strengthening.     Assessment:  Traction initiated today.  Tolerates.  Reports reduced symptoms at end of session.     Pain end of session: \"I feel great\".  Patient did not quantify    Plan:     Continue with current POC/no changes    Assessment of current progress against goals:  Symptomatic relief with treatment and Insufficient treatment time to assess progress    Goals:   STG 1: Patient will be IND with postural correction/scapular strengthening x 5 visits  LTG 1: Patient will report 0% score NDI  LTG 2: Patient will demonstrate 4/5 B/L UE strength  LTG 3: Patient will demonstrate 60 deg B/L cervical rotation  LTG 4: Patient will report > 50% improvement in sleep quality with regards to cervical radiculopathy symptoms.   "

## 2025-03-12 ENCOUNTER — TREATMENT (OUTPATIENT)
Dept: PHYSICAL THERAPY | Facility: CLINIC | Age: 60
End: 2025-03-12
Payer: COMMERCIAL

## 2025-03-12 DIAGNOSIS — M54.12 CERVICAL RADICULOPATHY: ICD-10-CM

## 2025-03-12 PROCEDURE — 97012 MECHANICAL TRACTION THERAPY: CPT | Mod: GP

## 2025-03-12 NOTE — PROGRESS NOTES
Physical Therapy Treatment     Patient Name: Dora Danielle  MRN: [id number]  Encounter date:  3/[id number]  Time Calculation  Start Time: 0830  Stop Time: 0900  Time Calculation (min): 30 min  PT Modalities Time Entry  Mechanical Traction Time Entry: 15     Visit Number:  3 (including evaluation)  Planned total visits: 10-20  Visits Authorized/Insurance Coverage:  [phone]     Current Problem  Problem List Items Addressed This Visit               ICD-10-CM     Cervical radiculopathy M54.12         Precautions   Neck/UE pain considerations     Pain  0     Subjective  Patient reports improved symptoms after first session.     Objective  Mild forward head posturing.      Treatment:  Mechanical traction x 15 minutes for cervical spine vertebral and cord decompression  60/20 on/off x 14/5 lb pulls  Wedge under LE's  No CI identified.  Gives consent.      Current HEP:  Chin tuck issued today, 3 x 10 reps BID     Activity tolerance:  Tolerates traction     OP EDUCATION:  Reviewed MRI indications and benefits of traction, importance of neck/postural strengthening.      Assessment:  Tolerates added traction pull.  Good symptomatic control but only x 1 week.  Need to evaluate if symptoms remain controlled.      Pain end of session: 0     Plan:  Continue with current POC/no changes     Assessment of current progress against goals:  Symptomatic relief with treatment and Insufficient treatment time to assess progress     Goals:   STG 1: Patient will be IND with postural correction/scapular strengthening x 5 visits  LTG 1: Patient will report 0% score NDI  LTG 2: Patient will demonstrate 4/5 B/L UE strength  LTG 3: Patient will demonstrate 60 deg B/L cervical rotation  LTG 4: Patient will report > 50% improvement in sleep quality with regards to cervical radiculopathy symptoms.

## 2025-04-02 ENCOUNTER — TREATMENT (OUTPATIENT)
Dept: PHYSICAL THERAPY | Facility: CLINIC | Age: 60
End: 2025-04-02
Payer: COMMERCIAL

## 2025-04-02 DIAGNOSIS — M54.12 CERVICAL RADICULOPATHY: ICD-10-CM

## 2025-04-02 PROCEDURE — 97012 MECHANICAL TRACTION THERAPY: CPT | Mod: GP

## 2025-04-02 NOTE — PROGRESS NOTES
Physical Therapy Treatment     Patient Name: Dora Danielle  MRN: [id number]  Encounter date:  4/2/2025  Time Calculation  Start Time: 0830  Stop Time: 0850  Time Calculation (min): 20 min  PT Modalities Time Entry  Mechanical Traction Time Entry: 15     Visit Number:  4 (including evaluation)  Planned total visits: 10-20  Visits Authorized/Insurance Coverage:  [phone]     Current Problem  Problem List Items Addressed This Visit               ICD-10-CM     Cervical radiculopathy M54.12         Precautions   Neck/UE pain considerations     Pain  2-3     Subjective  Reports mild, continued neck and R shoulder pain but felt traction helped last session.  Wishes to continue.      Objective  Improved cervical posturing.      Treatment:  Mechanical traction x 15 minutes for cervical spine vertebral and cord decompression  60/20 on/off x 16/5 lb pulls  Wedge under LE's  No CI identified.  Gives consent.      Current HEP:  Chin tuck issued today, 3 x 10 reps BID     Activity tolerance:  Tolerates traction     OP EDUCATION:  Reviewed MRI indications and benefits of traction, importance of neck/postural strengthening.      Assessment:  Traction pull progressed 14 to 16 lbs.  No symptoms at end of session.  Good response immediately after tx but inconsistent overall.      Pain end of session: 0     Plan:  Continue with current POC/no changes     Assessment of current progress against goals:  Symptomatic relief with treatment and Insufficient treatment time to assess progress     Goals:   STG 1: Patient will be IND with postural correction/scapular strengthening x 5 visits  LTG 1: Patient will report 0% score NDI  LTG 2: Patient will demonstrate 4/5 B/L UE strength  LTG 3: Patient will demonstrate 60 deg B/L cervical rotation  LTG 4: Patient will report > 50% improvement in sleep quality with regards to cervical radiculopathy symptoms.

## 2025-04-09 ENCOUNTER — TREATMENT (OUTPATIENT)
Dept: PHYSICAL THERAPY | Facility: CLINIC | Age: 60
End: 2025-04-09
Payer: COMMERCIAL

## 2025-04-09 DIAGNOSIS — M54.12 CERVICAL RADICULOPATHY: ICD-10-CM

## 2025-04-09 PROCEDURE — 97012 MECHANICAL TRACTION THERAPY: CPT | Mod: GP

## 2025-04-09 NOTE — PROGRESS NOTES
Physical Therapy Treatment     Patient Name: Dora Danielle  MRN: [id number]  Encounter date:  4/9/2025  Time Calculation  Start Time: 0830  Stop Time: 0900  Time Calculation (min): 30 min  PT Modalities Time Entry  Mechanical Traction Time Entry: 20     Visit Number:  5 (including evaluation)  Planned total visits: 10-20  Visits Authorized/Insurance Coverage:  [phone]     Current Problem  Problem List Items Addressed This Visit               ICD-10-CM     Cervical radiculopathy M54.12         Precautions   Neck/UE pain considerations     Pain  Denies     Subjective  Reports good symptom control but wants to continue traction for consistent pain control and prevention      Objective  Improved cervical posturing.      Treatment:  Mechanical traction x 15 minutes for cervical spine vertebral and cord decompression  60/20 on/off x 16/5 lb pulls  Wedge under LE's  No CI identified.  Gives consent.      Current HEP:  Chin tuck , 3 x 10 reps BID     Activity tolerance:  Tolerates traction     OP EDUCATION:  Reviewed MRI indications and benefits of traction, importance of neck/postural strengthening.      Assessment:  Good pain control.  Progressing appropriately.      Pain end of session: 0     Plan:  Continue with current POC/no changes     Assessment of current progress against goals:  Symptomatic relief with treatment and Insufficient treatment time to assess progress     Goals:   STG 1: Patient will be IND with postural correction/scapular strengthening x 5 visits  LTG 1: Patient will report 0% score NDI  LTG 2: Patient will demonstrate 4/5 B/L UE strength  LTG 3: Patient will demonstrate 60 deg B/L cervical rotation  LTG 4: Patient will report > 50% improvement in sleep quality with regards to cervical radiculopathy symptoms.

## 2025-04-16 ENCOUNTER — TREATMENT (OUTPATIENT)
Dept: PHYSICAL THERAPY | Facility: CLINIC | Age: 60
End: 2025-04-16
Payer: COMMERCIAL

## 2025-04-16 DIAGNOSIS — M54.12 CERVICAL RADICULOPATHY: ICD-10-CM

## 2025-04-16 PROCEDURE — 97012 MECHANICAL TRACTION THERAPY: CPT | Mod: GP

## 2025-04-16 NOTE — PROGRESS NOTES
Physical Therapy Treatment     Patient Name: Dora Danielle  MRN: [id number]  Encounter date:  4/16/2025  Time Calculation  Start Time: 0745  Stop Time: 0815  Time Calculation (min): 30 min  PT Modalities Time Entry  Mechanical Traction Time Entry: 15     Visit Number:  6 (including evaluation)  Planned total visits: 10-20  Visits Authorized/Insurance Coverage:  [id number]     Current Problem  Problem List Items Addressed This Visit               ICD-10-CM     Cervical radiculopathy M54.12         Precautions   Neck/UE pain considerations     Pain  Denies     Subjective  Continued pain control.  No new issues.      Objective  UT's less tender.      Treatment:  Mechanical traction x 15 minutes for cervical spine vertebral and cord decompression  60/20 on/off x 16/5 lb pulls  Wedge under LE's  No CI identified.  Gives consent.      Current HEP:  Chin tuck , 3 x 10 reps BID     Activity tolerance:  Tolerates traction     OP EDUCATION:  Reviewed MRI indications and benefits of traction, importance of neck/postural strengthening.      Assessment:  Patient demonstrates continued pain control overall.  Will continue POC to 10 visits then re-assess for discharge.     Pain end of session: 0     Plan:  Continue with current POC/no changes     Assessment of current progress against goals:  Symptomatic relief with treatment and Insufficient treatment time to assess progress     Goals:   STG 1: Patient will be IND with postural correction/scapular strengthening x 5 visits  LTG 1: Patient will report 0% score NDI  LTG 2: Patient will demonstrate 4/5 B/L UE strength  LTG 3: Patient will demonstrate 60 deg B/L cervical rotation  LTG 4: Patient will report > 50% improvement in sleep quality with regards to cervical radiculopathy symptoms.

## 2025-04-23 ENCOUNTER — TREATMENT (OUTPATIENT)
Dept: PHYSICAL THERAPY | Facility: CLINIC | Age: 60
End: 2025-04-23
Payer: COMMERCIAL

## 2025-04-23 DIAGNOSIS — M54.12 CERVICAL RADICULOPATHY: ICD-10-CM

## 2025-04-23 PROCEDURE — 97012 MECHANICAL TRACTION THERAPY: CPT | Mod: GP

## 2025-04-23 NOTE — PROGRESS NOTES
Physical Therapy Treatment     Patient Name: Dora Danielle  MRN: [id number]  Encounter date:  4/22/2025  Time Calculation  Start Time: 0830  Stop Time: 0853  Time Calculation (min): 23 min  PT Modalities Time Entry  Mechanical Traction Time Entry: 15     Visit Number:  7 (including evaluation)  Planned total visits: 10-20  Visits Authorized/Insurance Coverage:  [phone]     Current Problem  Problem List Items Addressed This Visit               ICD-10-CM     Cervical radiculopathy M54.12         Precautions   Neck/UE pain considerations     Pain  Denies     Subjective  Denies pain but reports discomfort with R shoulder when attempting to lie on it.  Questionable cervical radiculopathy connection to shoulder discomfort.  Recommended patient have R shoulder examined in future doctors appointment.  Verbalizes understanding.      Objective  Neck ROM WFL     Treatment:  Mechanical traction x 15 minutes for cervical spine vertebral and cord decompression  60/20 on/off x 16/5 lb pulls  Wedge under LE's  No CI identified.  Gives consent.      Current HEP:  Chin tuck , 3 x 10 reps BID     Activity tolerance:  Tolerates traction     OP EDUCATION:  Reviewed MRI indications and benefits of traction, importance of neck/postural strengthening.      Assessment:  Continued pain control.  Traction tolerance remains around 16 lbs.  Unable to progress pull at this time.     Pain end of session: 0     Plan:  Continue with current POC/no changes     Assessment of current progress against goals:  Symptomatic relief with treatment and Insufficient treatment time to assess progress     Goals:   STG 1: Patient will be IND with postural correction/scapular strengthening x 5 visits  LTG 1: Patient will report 0% score NDI  LTG 2: Patient will demonstrate 4/5 B/L UE strength  LTG 3: Patient will demonstrate 60 deg B/L cervical rotation  LTG 4: Patient will report > 50% improvement in sleep quality with regards to cervical radiculopathy  symptoms.

## 2025-04-30 ENCOUNTER — TREATMENT (OUTPATIENT)
Dept: PHYSICAL THERAPY | Facility: CLINIC | Age: 60
End: 2025-04-30
Payer: COMMERCIAL

## 2025-04-30 DIAGNOSIS — M54.12 CERVICAL RADICULOPATHY: ICD-10-CM

## 2025-04-30 PROCEDURE — 97012 MECHANICAL TRACTION THERAPY: CPT | Mod: GP

## 2025-04-30 NOTE — PROGRESS NOTES
Physical Therapy Treatment     Patient Name: Dora Danielle  MRN: [id number]  Encounter date:  4/30/2025  Time Calculation  Start Time: 0745  Stop Time: 0810  Time Calculation (min): 25 min  PT Modalities Time Entry  Mechanical Traction Time Entry: 15     Visit Number:  8 (including evaluation)  Planned total visits: 10-20  Visits Authorized/Insurance Coverage:  [phone]     Current Problem  Problem List Items Addressed This Visit               ICD-10-CM     Cervical radiculopathy M54.12         Precautions   Neck/UE pain considerations     Pain  Denies     Subjective  No new issues.  Feeling good.  Will continue POC to 10 visits then re-assess.     Objective  Neck ROM WFL     Treatment:  Mechanical traction x 15 minutes for cervical spine vertebral and cord decompression  60/20 on/off x 16/5 lb pulls  Wedge under LE's  No CI identified.  Gives consent.      Current HEP:  Chin tuck , 3 x 10 reps BID     Activity tolerance:  Tolerates traction     OP EDUCATION:  Reviewed MRI indications and benefits of traction, importance of neck/postural strengthening.      Assessment: Progressing appropriately. Good pain control.  Will re-assess for discharge at visit 10.        Pain end of session: 0     Plan:  Continue with current POC/no changes     Assessment of current progress against goals:  Symptomatic relief with treatment      Goals:   STG 1: Patient will be IND with postural correction/scapular strengthening x 5 visits  LTG 1: Patient will report 0% score NDI  LTG 2: Patient will demonstrate 4/5 B/L UE strength  LTG 3: Patient will demonstrate 60 deg B/L cervical rotation  LTG 4: Patient will report > 50% improvement in sleep quality with regards to cervical radiculopathy symptoms.

## 2025-05-07 ENCOUNTER — TREATMENT (OUTPATIENT)
Dept: PHYSICAL THERAPY | Facility: CLINIC | Age: 60
End: 2025-05-07
Payer: COMMERCIAL

## 2025-05-07 DIAGNOSIS — M54.12 CERVICAL RADICULOPATHY: ICD-10-CM

## 2025-05-07 PROCEDURE — 97012 MECHANICAL TRACTION THERAPY: CPT | Mod: GP

## 2025-05-07 NOTE — PROGRESS NOTES
Physical Therapy Treatment     Patient Name: Dora Danielle  MRN: [id number]  Encounter date:  5/7/2025  Time Calculation  Start Time: 0830  Stop Time: 0850  Time Calculation (min): 20 min  PT Modalities Time Entry  Mechanical Traction Time Entry: 10     Visit Number:  9 (including evaluation)  Planned total visits: 10-20  Visits Authorized/Insurance Coverage:  [phone]     Current Problem  Problem List Items Addressed This Visit               ICD-10-CM     Cervical radiculopathy M54.12         Precautions   Neck/UE pain considerations     Pain  Denies     Subjective  No changes.  Denies symptoms     Objective  Neck ROM WFL     Treatment:  Mechanical traction x 10 minutes for cervical spine vertebral and cord decompression  60/20 on/off x 18/5 lb pulls  Wedge under LE's  No CI identified.  Gives consent.   Limited to x 10 minutes today     Current HEP:  Chin tuck , 3 x 10 reps BID     Activity tolerance:  Tolerates traction     OP EDUCATION:  Reviewed MRI indications and benefits of traction, importance of neck/postural strengthening.      Assessment: Some neck tightness reported with traction while in device today.  Tx terminated at 10 minutes but once off [id number], no pain.  0/10 at exit.         Pain end of session: 0     Plan:  Continue with current POC/no changes     Assessment of current progress against goals:  Symptomatic relief with treatment      Goals:   STG 1: Patient will be IND with postural correction/scapular strengthening x 5 visits  LTG 1: Patient will report 0% score NDI  LTG 2: Patient will demonstrate 4/5 B/L UE strength  LTG 3: Patient will demonstrate 60 deg B/L cervical rotation  LTG 4: Patient will report > 50% improvement in sleep quality with regards to cervical radiculopathy symptoms.

## 2025-05-14 ENCOUNTER — TREATMENT (OUTPATIENT)
Dept: PHYSICAL THERAPY | Facility: CLINIC | Age: 60
End: 2025-05-14
Payer: COMMERCIAL

## 2025-05-14 DIAGNOSIS — M54.12 CERVICAL RADICULOPATHY: ICD-10-CM

## 2025-05-14 PROCEDURE — 97012 MECHANICAL TRACTION THERAPY: CPT | Mod: GP

## 2025-05-14 NOTE — PROGRESS NOTES
Physical Therapy Treatment/Discharge Summary     Patient Name: Dora Danielle  MRN: [id number]  Encounter date:  5/14/2025  Time Calculation  Start Time: 0745  Stop Time: 0815  Time Calculation (min): 30 min  PT Modalities Time Entry  Mechanical Traction Time Entry: 15     Visit Number:  10 (including evaluation)  Planned total visits: 10-20  Visits Authorized/Insurance Coverage:  [phone]     Current Problem  Problem List Items Addressed This Visit               ICD-10-CM     Cervical radiculopathy M54.12         Precautions   Neck/UE pain considerations     Pain  Denies     Subjective  Last scheduled visit.  Re-evaluated.  Neck pain control. ROM WFL. Agreeable to treatment.      Objective  Neck ROM WFL     Treatment:  Mechanical traction x 10 minutes for cervical spine vertebral and cord decompression  60/20 on/off x 16/5 lb pulls  Wedge under LE's  No CI identified.  Gives consent.   15 minutes, no pain today.      Current HEP:  Chin tuck , 3 x 10 reps BID     Activity tolerance:  Tolerates traction     OP EDUCATION:  Reviewed MRI indications and benefits of traction, importance of neck/postural strengthening.      Assessment: All goals achieved.  Neck pain and functional WNL.  Ready for discharge.      Pain end of session: 0     Plan:  Continue with current POC/no changes     Assessment of current progress against goals:  Symptomatic relief with treatment      Goals:   STG 1: Patient will be IND with postural correction/scapular strengthening x 5 visits  LTG 1: Patient will report 0% score NDI  LTG 2: Patient will demonstrate 4/5 B/L UE strength  LTG 3: Patient will demonstrate 60 deg B/L cervical rotation  LTG 4: Patient will report > 50% improvement in sleep quality with regards to cervical radiculopathy symptoms.

## 2025-05-28 ENCOUNTER — APPOINTMENT (OUTPATIENT)
Dept: PHYSICAL THERAPY | Facility: CLINIC | Age: 60
End: 2025-05-28
Payer: COMMERCIAL

## 2025-06-03 ENCOUNTER — APPOINTMENT (OUTPATIENT)
Dept: LAB | Facility: HOSPITAL | Age: 60
End: 2025-06-03
Payer: COMMERCIAL

## 2025-06-03 ENCOUNTER — LAB (OUTPATIENT)
Dept: LAB | Facility: HOSPITAL | Age: 60
End: 2025-06-03
Payer: COMMERCIAL

## 2025-06-04 LAB
25(OH)D3+25(OH)D2 SERPL-MCNC: 50 NG/ML (ref 30–100)
ALBUMIN SERPL-MCNC: 4.6 G/DL (ref 3.6–5.1)
ALP SERPL-CCNC: 63 U/L (ref 37–153)
ALT SERPL-CCNC: 18 U/L (ref 6–29)
ANION GAP SERPL CALCULATED.4IONS-SCNC: 10 MMOL/L (CALC) (ref 7–17)
AST SERPL-CCNC: 22 U/L (ref 10–35)
BILIRUB SERPL-MCNC: 0.8 MG/DL (ref 0.2–1.2)
BUN SERPL-MCNC: 19 MG/DL (ref 7–25)
CALCIUM SERPL-MCNC: 10 MG/DL (ref 8.6–10.4)
CHLORIDE SERPL-SCNC: 105 MMOL/L (ref 98–110)
CHOLEST SERPL-MCNC: 259 MG/DL
CHOLEST/HDLC SERPL: 2.5 (CALC)
CO2 SERPL-SCNC: 25 MMOL/L (ref 20–32)
CREAT SERPL-MCNC: 0.76 MG/DL (ref 0.5–1.05)
EGFRCR SERPLBLD CKD-EPI 2021: 89 ML/MIN/1.73M2
EST. AVERAGE GLUCOSE BLD GHB EST-MCNC: 128 MG/DL
EST. AVERAGE GLUCOSE BLD GHB EST-SCNC: 7.1 MMOL/L
GLUCOSE SERPL-MCNC: 118 MG/DL (ref 65–99)
HBA1C MFR BLD: 6.1 %
HDLC SERPL-MCNC: 104 MG/DL
LDLC SERPL CALC-MCNC: 127 MG/DL (CALC)
NONHDLC SERPL-MCNC: 155 MG/DL (CALC)
POTASSIUM SERPL-SCNC: 4 MMOL/L (ref 3.5–5.3)
PROT SERPL-MCNC: 6.9 G/DL (ref 6.1–8.1)
SODIUM SERPL-SCNC: 140 MMOL/L (ref 135–146)
TRIGL SERPL-MCNC: 162 MG/DL

## 2025-06-13 ENCOUNTER — OFFICE VISIT (OUTPATIENT)
Dept: PRIMARY CARE | Facility: CLINIC | Age: 60
End: 2025-06-13
Payer: COMMERCIAL

## 2025-06-13 VITALS
HEART RATE: 78 BPM | HEIGHT: 66 IN | SYSTOLIC BLOOD PRESSURE: 146 MMHG | DIASTOLIC BLOOD PRESSURE: 94 MMHG | WEIGHT: 179.4 LBS | BODY MASS INDEX: 28.83 KG/M2 | OXYGEN SATURATION: 97 %

## 2025-06-13 DIAGNOSIS — E78.5 HYPERLIPIDEMIA, UNSPECIFIED HYPERLIPIDEMIA TYPE: ICD-10-CM

## 2025-06-13 DIAGNOSIS — Z12.31 BREAST CANCER SCREENING BY MAMMOGRAM: ICD-10-CM

## 2025-06-13 DIAGNOSIS — R73.01 IMPAIRED FASTING GLUCOSE: ICD-10-CM

## 2025-06-13 DIAGNOSIS — Z87.891 HISTORY OF TOBACCO ABUSE: ICD-10-CM

## 2025-06-13 DIAGNOSIS — Z00.00 ROUTINE GENERAL MEDICAL EXAMINATION AT A HEALTH CARE FACILITY: Primary | ICD-10-CM

## 2025-06-13 DIAGNOSIS — I10 ESSENTIAL (PRIMARY) HYPERTENSION: ICD-10-CM

## 2025-06-13 PROCEDURE — 3008F BODY MASS INDEX DOCD: CPT | Performed by: PHYSICIAN ASSISTANT

## 2025-06-13 PROCEDURE — 3077F SYST BP >= 140 MM HG: CPT | Performed by: PHYSICIAN ASSISTANT

## 2025-06-13 PROCEDURE — 3080F DIAST BP >= 90 MM HG: CPT | Performed by: PHYSICIAN ASSISTANT

## 2025-06-13 PROCEDURE — 99396 PREV VISIT EST AGE 40-64: CPT | Performed by: PHYSICIAN ASSISTANT

## 2025-06-13 RX ORDER — AMLODIPINE BESYLATE 5 MG/1
5 TABLET ORAL DAILY
Qty: 90 TABLET | Refills: 1 | Status: SHIPPED | OUTPATIENT
Start: 2025-06-13 | End: 2026-06-13

## 2025-06-13 RX ORDER — ATORVASTATIN CALCIUM 10 MG/1
10 TABLET, FILM COATED ORAL DAILY
Qty: 90 TABLET | Refills: 3 | Status: SHIPPED | OUTPATIENT
Start: 2025-06-13 | End: 2026-07-18

## 2025-06-13 RX ORDER — LOSARTAN POTASSIUM AND HYDROCHLOROTHIAZIDE 25; 100 MG/1; MG/1
1 TABLET ORAL DAILY
Qty: 90 TABLET | Refills: 1 | Status: SHIPPED | OUTPATIENT
Start: 2025-06-13

## 2025-06-13 RX ORDER — LOSARTAN POTASSIUM AND HYDROCHLOROTHIAZIDE 25; 100 MG/1; MG/1
1 TABLET ORAL DAILY
Qty: 90 TABLET | Refills: 1 | Status: SHIPPED | OUTPATIENT
Start: 2025-06-13 | End: 2025-06-13 | Stop reason: SDUPTHER

## 2025-06-13 ASSESSMENT — PAIN SCALES - GENERAL: PAINLEVEL_OUTOF10: 0-NO PAIN

## 2025-06-13 NOTE — PROGRESS NOTES
Subjective   Patient ID: Dora Danielle is a 60 y.o. female who presents for Annual Exam.    Presents for RPE.   Denies any changes at this time - has completed therapy which helps.   Recent labs completed mostly unchanged .  Below is the patient's most recent value for Albumin, ALT, AST, BUN, Calcium, Chloride, Cholesterol, CO2, Creatinine, GFR, Glucose, HDL, Hematocrit, Hemoglobin, Hemoglobin A1C, LDL, Magnesium, Phosphorus, Platelets, Potassium, PSA, Sodium, Triglycerides, and WBC.   Lab Results       Component                Value               Date                       ALBUMIN                  4.6                 06/03/2025                 ALT                      18                  06/03/2025                 AST                      22                  06/03/2025                 BUN                      19                  06/03/2025                 CALCIUM                  10.0                06/03/2025                 CL                       105                 06/03/2025                 CHOL                     259 (H)             06/03/2025                 CO2                      25                  06/03/2025                 CREATININE               0.76                06/03/2025                 HDL                      104                 06/03/2025                 HCT                      46.5 (H)            12/21/2022                 HGB                      16.2 (H)            12/21/2022                 HGBA1C                   6.1 (H)             06/03/2025                 PLT                      206                 12/21/2022                 K                        4.0                 06/03/2025                 NA                       140                 06/03/2025                 TRIG                     162 (H)             06/03/2025                 WBC                      5.0                 12/21/2022            Note: for a comprehensive list of the patient's lab results, access the  "Results Review activity.           Review of Systems   All other systems reviewed and are negative.      Objective   BP (!) 146/94   Pulse 78   Ht 1.676 m (5' 6\")   Wt 81.4 kg (179 lb 6.4 oz)   SpO2 97%   BMI 28.96 kg/m²     Physical Exam  Constitutional:       Appearance: Normal appearance.   HENT:      Right Ear: Tympanic membrane and ear canal normal.      Left Ear: Ear canal normal.      Nose: Nose normal.      Mouth/Throat:      Pharynx: Oropharynx is clear.   Eyes:      Pupils: Pupils are equal, round, and reactive to light.   Cardiovascular:      Rate and Rhythm: Normal rate and regular rhythm.   Pulmonary:      Breath sounds: Normal breath sounds.   Abdominal:      General: Bowel sounds are normal.      Palpations: Abdomen is soft.   Musculoskeletal:         General: Normal range of motion.   Lymphadenopathy:      Cervical: No cervical adenopathy.   Skin:     Findings: No rash.   Neurological:      General: No focal deficit present.      Mental Status: She is alert.         Assessment/Plan   Diagnoses and all orders for this visit:  Routine general medical examination at a health care facility  History of tobacco abuse  -     CT lung screening low dose; Future  Hyperlipidemia, unspecified hyperlipidemia type  -     Comprehensive Metabolic Panel; Future  -     Lipid Panel; Future  -     Hemoglobin A1C; Future  Essential (primary) hypertension  -     amLODIPine (Norvasc) 5 mg tablet; Take 1 tablet (5 mg) by mouth once daily.  -     losartan-hydrochlorothiazide (Hyzaar) 100-25 mg tablet; Take 1 tablet by mouth once daily.  -     Comprehensive Metabolic Panel; Future  -     Lipid Panel; Future  -     Hemoglobin A1C; Future  Impaired fasting glucose  -     atorvastatin (Lipitor) 10 mg tablet; Take 1 tablet (10 mg) by mouth once daily.  -     Comprehensive Metabolic Panel; Future  -     Lipid Panel; Future  -     Hemoglobin A1C; Future  Breast cancer screening by mammogram  -     BI mammo bilateral screening " tomosynthesis; Future

## 2025-06-18 DIAGNOSIS — E55.9 VITAMIN D DEFICIENCY: ICD-10-CM

## 2025-06-18 RX ORDER — ERGOCALCIFEROL 1.25 MG/1
50000 CAPSULE ORAL WEEKLY
Qty: 12 CAPSULE | Refills: 1 | Status: SHIPPED | OUTPATIENT
Start: 2025-06-18 | End: 2025-06-19 | Stop reason: SDUPTHER

## 2025-06-19 DIAGNOSIS — E55.9 VITAMIN D DEFICIENCY: ICD-10-CM

## 2025-06-19 RX ORDER — ERGOCALCIFEROL 1.25 MG/1
50000 CAPSULE ORAL WEEKLY
Qty: 12 CAPSULE | Refills: 1 | Status: SHIPPED | OUTPATIENT
Start: 2025-06-19 | End: 2025-12-04